# Patient Record
Sex: FEMALE | Race: WHITE | NOT HISPANIC OR LATINO | Employment: FULL TIME | RURAL
[De-identification: names, ages, dates, MRNs, and addresses within clinical notes are randomized per-mention and may not be internally consistent; named-entity substitution may affect disease eponyms.]

---

## 2018-07-21 ENCOUNTER — OFFICE VISIT (OUTPATIENT)
Dept: RETAIL CLINIC | Facility: CLINIC | Age: 18
End: 2018-07-21

## 2018-07-21 VITALS
DIASTOLIC BLOOD PRESSURE: 84 MMHG | WEIGHT: 127.2 LBS | OXYGEN SATURATION: 98 % | HEIGHT: 60 IN | HEART RATE: 74 BPM | SYSTOLIC BLOOD PRESSURE: 138 MMHG | BODY MASS INDEX: 24.97 KG/M2 | TEMPERATURE: 98 F | RESPIRATION RATE: 14 BRPM

## 2018-07-21 DIAGNOSIS — K52.9 GASTROENTERITIS: Primary | ICD-10-CM

## 2018-07-21 PROCEDURE — 99203 OFFICE O/P NEW LOW 30 MIN: CPT | Performed by: NURSE PRACTITIONER

## 2018-07-21 RX ORDER — ONDANSETRON HYDROCHLORIDE 8 MG/1
8 TABLET, FILM COATED ORAL EVERY 8 HOURS PRN
Qty: 15 TABLET | Refills: 0 | Status: SHIPPED | OUTPATIENT
Start: 2018-07-21 | End: 2018-07-24

## 2018-07-21 NOTE — PATIENT INSTRUCTIONS
Viral Gastroenteritis, Adult  Viral gastroenteritis is also known as the stomach flu. This condition is caused by various viruses. These viruses can be passed from person to person very easily (are very contagious). This condition may affect your stomach, small intestine, and large intestine. It can cause sudden watery diarrhea, fever, and vomiting.  Diarrhea and vomiting can make you feel weak and cause you to become dehydrated. You may not be able to keep fluids down. Dehydration can make you tired and thirsty, cause you to have a dry mouth, and decrease how often you urinate. Older adults and people with other diseases or a weak immune system are at higher risk for dehydration.  It is important to replace the fluids that you lose from diarrhea and vomiting. If you become severely dehydrated, you may need to get fluids through an IV tube.  What are the causes?  Gastroenteritis is caused by various viruses, including rotavirus and norovirus. Norovirus is the most common cause in adults.  You can get sick by eating food, drinking water, or touching a surface contaminated with one of these viruses. You can also get sick from sharing utensils or other personal items with an infected person.  What increases the risk?  This condition is more likely to develop in people:  · Who have a weak defense system (immune system).  · Who live with one or more children who are younger than 2 years old.  · Who live in a nursing home.  · Who go on cruise ships.    What are the signs or symptoms?  Symptoms of this condition start suddenly 1-2 days after exposure to a virus. Symptoms may last a few days or as long as a week. The most common symptoms are watery diarrhea and vomiting. Other symptoms include:  · Fever.  · Headache.  · Fatigue.  · Pain in the abdomen.  · Chills.  · Weakness.  · Nausea.  · Muscle aches.  · Loss of appetite.    How is this diagnosed?  This condition is diagnosed with a medical history and physical exam. You  may also have a stool test to check for viruses or other infections.  How is this treated?  This condition typically goes away on its own. The focus of treatment is to restore lost fluids (rehydration). Your health care provider may recommend that you take an oral rehydration solution (ORS) to replace important salts and minerals (electrolytes) in your body. Severe cases of this condition may require giving fluids through an IV tube.  Treatment may also include medicine to help with your symptoms.  Follow these instructions at home:  Follow instructions from your health care provider about how to care for yourself at home.  Eating and drinking  Follow these recommendations as told by your health care provider:  · Take an ORS. This is a drink that is sold at pharmacies and retail stores.  · Drink clear fluids in small amounts as you are able. Clear fluids include water, ice chips, diluted fruit juice, and low-calorie sports drinks.  · Eat bland, easy-to-digest foods in small amounts as you are able. These foods include bananas, applesauce, rice, lean meats, toast, and crackers.  · Avoid fluids that contain a lot of sugar or caffeine, such as energy drinks, sports drinks, and soda.  · Avoid alcohol.  · Avoid spicy or fatty foods.    General instructions    · Drink enough fluid to keep your urine clear or pale yellow.  · Wash your hands often. If soap and water are not available, use hand .  · Make sure that all people in your household wash their hands well and often.  · Take over-the-counter and prescription medicines only as told by your health care provider.  · Rest at home while you recover.  · Watch your condition for any changes.  · Take a warm bath to relieve any burning or pain from frequent diarrhea episodes.  · Keep all follow-up visits as told by your health care provider. This is important.  Contact a health care provider if:  · You cannot keep fluids down.  · Your symptoms get worse.  · You have  new symptoms.  · You feel light-headed or dizzy.  · You have muscle cramps.  Get help right away if:  · You have chest pain.  · You feel extremely weak or you faint.  · You see blood in your vomit.  · Your vomit looks like coffee grounds.  · You have bloody or black stools or stools that look like tar.  · You have a severe headache, a stiff neck, or both.  · You have a rash.  · You have severe pain, cramping, or bloating in your abdomen.  · You have trouble breathing or you are breathing very quickly.  · Your heart is beating very quickly.  · Your skin feels cold and clammy.  · You feel confused.  · You have pain when you urinate.  · You have signs of dehydration, such as:  ? Dark urine, very little urine, or no urine.  ? Cracked lips.  ? Dry mouth.  ? Sunken eyes.  ? Sleepiness.  ? Weakness.  This information is not intended to replace advice given to you by your health care provider. Make sure you discuss any questions you have with your health care provider.  Document Released: 12/18/2006 Document Revised: 05/31/2017 Document Reviewed: 08/23/2016  [employer] Patient Education © 2018 [employer]

## 2018-07-21 NOTE — PROGRESS NOTES
"Destiny Cordon is a 17 y.o. female.   /84   Pulse 74   Temp 98 °F (36.7 °C) (Temporal Artery )   Resp 14   Ht 152.4 cm (60\")   Wt 57.7 kg (127 lb 3.2 oz)   LMP  (LMP Unknown)   SpO2 98%   BMI 24.84 kg/m²       Vomiting    This is a new problem. The current episode started yesterday. The problem occurs 2 to 4 times per day. The problem has been unchanged. The maximum temperature recorded prior to her arrival was 100.4 - 100.9 F. Associated symptoms include abdominal pain (crampy), coughing, diarrhea, a fever and myalgias. Pertinent negatives include no arthralgias, chest pain, chills, dizziness, headaches, sweats, URI or weight loss. Risk factors include ill contacts (with similar stomach symptoms).   Fever    Associated symptoms include abdominal pain (crampy), coughing, diarrhea and vomiting. Pertinent negatives include no chest pain or headaches.   Cough   Associated symptoms include a fever and myalgias. Pertinent negatives include no chest pain, chills, headaches, sweats or weight loss.        The following portions of the patient's history were reviewed and updated as appropriate: allergies, current medications, past family history, past medical history, past social history, past surgical history and problem list.    Review of Systems   Constitutional: Positive for fever. Negative for chills and weight loss.   Respiratory: Positive for cough.    Cardiovascular: Negative for chest pain.   Gastrointestinal: Positive for abdominal pain (crampy), diarrhea and vomiting.   Musculoskeletal: Positive for myalgias. Negative for arthralgias.   Neurological: Negative for dizziness and headaches.       Objective   Physical Exam   Constitutional: She appears well-developed and well-nourished.   HENT:   Head: Normocephalic and atraumatic.   Cardiovascular: Regular rhythm and normal heart sounds.    Pulmonary/Chest: Effort normal. She has no wheezes. She has no rhonchi. She has no rales. "   Abdominal: Soft. Normal appearance and bowel sounds are normal. There is no tenderness. There is no rigidity, no rebound, no guarding, no CVA tenderness, no tenderness at McBurney's point and negative Hernandez's sign.   Lymphadenopathy:     She has no cervical adenopathy.   Skin: Skin is warm and dry.       Assessment/Plan   Audrey was seen today for vomiting, fever and cough.    Diagnoses and all orders for this visit:    Gastroenteritis    Other orders  -     ondansetron (ZOFRAN) 8 MG tablet; Take 1 tablet by mouth Every 8 (Eight) Hours As Needed for Nausea or Vomiting for up to 3 days.

## 2018-08-24 ENCOUNTER — OFFICE VISIT (OUTPATIENT)
Dept: RETAIL CLINIC | Facility: CLINIC | Age: 18
End: 2018-08-24

## 2018-08-24 VITALS
HEART RATE: 81 BPM | TEMPERATURE: 98.4 F | SYSTOLIC BLOOD PRESSURE: 114 MMHG | WEIGHT: 126.4 LBS | OXYGEN SATURATION: 98 % | DIASTOLIC BLOOD PRESSURE: 78 MMHG | BODY MASS INDEX: 24.69 KG/M2 | RESPIRATION RATE: 14 BRPM

## 2018-08-24 DIAGNOSIS — R68.89 FLU-LIKE SYMPTOMS: ICD-10-CM

## 2018-08-24 DIAGNOSIS — J02.9 SORE THROAT: Primary | ICD-10-CM

## 2018-08-24 LAB
EXPIRATION DATE: NORMAL
EXPIRATION DATE: NORMAL
FLUAV AG NPH QL: NEGATIVE
FLUBV AG NPH QL: NEGATIVE
INTERNAL CONTROL: NORMAL
INTERNAL CONTROL: NORMAL
Lab: NORMAL
Lab: NORMAL
S PYO AG THROAT QL: NEGATIVE

## 2018-08-24 PROCEDURE — 87804 INFLUENZA ASSAY W/OPTIC: CPT | Performed by: NURSE PRACTITIONER

## 2018-08-24 PROCEDURE — 87880 STREP A ASSAY W/OPTIC: CPT | Performed by: NURSE PRACTITIONER

## 2018-08-24 PROCEDURE — 99213 OFFICE O/P EST LOW 20 MIN: CPT | Performed by: NURSE PRACTITIONER

## 2018-08-24 RX ORDER — AMOXICILLIN AND CLAVULANATE POTASSIUM 500; 125 MG/1; MG/1
1 TABLET, FILM COATED ORAL 2 TIMES DAILY
Qty: 14 TABLET | Refills: 0 | Status: SHIPPED | OUTPATIENT
Start: 2018-08-24 | End: 2018-08-31

## 2018-08-24 RX ORDER — BROMPHENIRAMINE MALEATE, PSEUDOEPHEDRINE HYDROCHLORIDE, AND DEXTROMETHORPHAN HYDROBROMIDE 2; 30; 10 MG/5ML; MG/5ML; MG/5ML
5 SYRUP ORAL 4 TIMES DAILY PRN
Qty: 100 ML | Refills: 0 | Status: SHIPPED | OUTPATIENT
Start: 2018-08-24 | End: 2018-08-29

## 2018-08-24 NOTE — PATIENT INSTRUCTIONS

## 2018-08-24 NOTE — PROGRESS NOTES
Destiny Cordon is a 17 y.o. female.   /78   Pulse 81   Temp 98.4 °F (36.9 °C) (Temporal Artery )   Resp 14   Wt 57.3 kg (126 lb 6.4 oz)   LMP  (LMP Unknown)   SpO2 98%   BMI 24.69 kg/m²     No Known Allergies  Past Medical History:   Diagnosis Date   • ADHD (attention deficit hyperactivity disorder)    • Allergic    • Anxiety    • Bronchitis, chronic (CMS/HCC)    • Depression    • Migraines        Fever    This is a new problem. The current episode started in the past 7 days. The problem has been waxing and waning. The maximum temperature noted was 101 to 101.9 F. Associated symptoms include chest pain (with cough), congestion, coughing, diarrhea, headaches, muscle aches, nausea, a sore throat and vomiting (due to coughing). Pertinent negatives include no abdominal pain, ear pain, rash, sleepiness, urinary pain or wheezing.   Sore Throat    Associated symptoms include congestion, coughing, diarrhea, headaches and vomiting (due to coughing). Pertinent negatives include no abdominal pain or ear pain.        The following portions of the patient's history were reviewed and updated as appropriate: allergies, current medications, past family history, past medical history, past social history, past surgical history and problem list.    Review of Systems   Constitutional: Positive for fever.   HENT: Positive for congestion and sore throat. Negative for ear pain.    Respiratory: Positive for cough. Negative for wheezing.    Cardiovascular: Positive for chest pain (with cough).   Gastrointestinal: Positive for diarrhea, nausea and vomiting (due to coughing). Negative for abdominal pain.   Genitourinary: Negative for dysuria.   Skin: Negative for rash.   Neurological: Positive for headaches.       Objective   Physical Exam   Constitutional: She appears well-developed and well-nourished.  Non-toxic appearance. She has a sickly appearance. She appears ill.   HENT:   Head: Normocephalic and atraumatic.    Right Ear: Tympanic membrane and ear canal normal.   Left Ear: Tympanic membrane and ear canal normal.   Nose: Mucosal edema and rhinorrhea present. Right sinus exhibits no maxillary sinus tenderness and no frontal sinus tenderness. Left sinus exhibits no maxillary sinus tenderness and no frontal sinus tenderness.   Mouth/Throat: Uvula is midline. Posterior oropharyngeal erythema present. No tonsillar exudate.   Cardiovascular: Regular rhythm and normal heart sounds.    Pulmonary/Chest: Effort normal. She has no wheezes. She has rhonchi (moderate to severe). She has no rales.   Lymphadenopathy:     She has no cervical adenopathy.   Skin: Skin is warm and dry.       Assessment/Plan   [facility] was seen today for fever, sore throat, cough and diarrhea.    Diagnoses and all orders for this visit:    Sore throat  -     POC Rapid Strep A    Flu-like symptoms  -     POC Influenza A / B    Other orders  -     amoxicillin-clavulanate (AUGMENTIN) 500-125 MG per tablet; Take 1 tablet by mouth 2 (Two) Times a Day for 7 days.  -     brompheniramine-pseudoephedrine-DM 30-2-10 MG/5ML syrup; Take 5 mL by mouth 4 (Four) Times a Day As Needed for Cough for up to 5 days.

## 2020-01-07 ENCOUNTER — APPOINTMENT (OUTPATIENT)
Dept: GENERAL RADIOLOGY | Facility: HOSPITAL | Age: 20
End: 2020-01-07

## 2020-01-07 ENCOUNTER — HOSPITAL ENCOUNTER (EMERGENCY)
Facility: HOSPITAL | Age: 20
Discharge: HOME | End: 2020-01-07
Attending: EMERGENCY MEDICINE | Admitting: EMERGENCY MEDICINE

## 2020-01-07 ENCOUNTER — APPOINTMENT (OUTPATIENT)
Dept: CT IMAGING | Facility: HOSPITAL | Age: 20
End: 2020-01-07

## 2020-01-07 VITALS
BODY MASS INDEX: 29.45 KG/M2 | RESPIRATION RATE: 14 BRPM | WEIGHT: 150 LBS | SYSTOLIC BLOOD PRESSURE: 137 MMHG | OXYGEN SATURATION: 98 % | TEMPERATURE: 98 F | HEIGHT: 60 IN | HEART RATE: 78 BPM | DIASTOLIC BLOOD PRESSURE: 68 MMHG

## 2020-01-07 DIAGNOSIS — S00.33XA CONTUSION OF NOSE, INITIAL ENCOUNTER: ICD-10-CM
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
## 2020-01-07 LAB
B-HCG UR QL: NEGATIVE
INTERNAL NEGATIVE CONTROL: NEGATIVE
INTERNAL POSITIVE CONTROL: POSITIVE
Lab: NORMAL

## 2020-01-07 PROCEDURE — 72125 CT NECK SPINE W/O DYE: CPT

## 2020-01-07 PROCEDURE — 70450 CT HEAD/BRAIN W/O DYE: CPT

## 2020-01-07 PROCEDURE — 99283 EMERGENCY DEPT VISIT LOW MDM: CPT

## 2020-01-07 PROCEDURE — 81025 URINE PREGNANCY TEST: CPT | Performed by: PHYSICIAN ASSISTANT

## 2020-01-07 PROCEDURE — 70486 CT MAXILLOFACIAL W/O DYE: CPT

## 2020-01-07 RX ORDER — FLUTICASONE PROPIONATE 50 MCG
2 SPRAY, SUSPENSION (ML) NASAL DAILY
Qty: 1 BOTTLE | Refills: 0 | Status: SHIPPED | OUTPATIENT
Start: 2020-01-07 | End: 2020-01-12

## 2020-01-07 RX ORDER — METHOCARBAMOL 500 MG/1
500 TABLET, FILM COATED ORAL 3 TIMES DAILY
Qty: 14 TABLET | Refills: 0 | OUTPATIENT
Start: 2020-01-07 | End: 2021-02-18
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
## 2021-02-18 PROCEDURE — U0004 COV-19 TEST NON-CDC HGH THRU: HCPCS | Performed by: FAMILY MEDICINE

## 2021-02-20 ENCOUNTER — TELEPHONE (OUTPATIENT)
Dept: URGENT CARE | Facility: CLINIC | Age: 21
End: 2021-02-20

## 2021-09-02 PROCEDURE — U0004 COV-19 TEST NON-CDC HGH THRU: HCPCS | Performed by: FAMILY MEDICINE

## 2021-09-03 ENCOUNTER — TELEPHONE (OUTPATIENT)
Dept: URGENT CARE | Facility: CLINIC | Age: 21
End: 2021-09-03

## 2022-01-25 PROCEDURE — U0004 COV-19 TEST NON-CDC HGH THRU: HCPCS | Performed by: FAMILY MEDICINE

## 2022-05-30 ENCOUNTER — TELEPHONE (OUTPATIENT)
Dept: URGENT CARE | Facility: CLINIC | Age: 22
End: 2022-05-30